# Patient Record
Sex: MALE | Race: BLACK OR AFRICAN AMERICAN | Employment: UNEMPLOYED | URBAN - METROPOLITAN AREA
[De-identification: names, ages, dates, MRNs, and addresses within clinical notes are randomized per-mention and may not be internally consistent; named-entity substitution may affect disease eponyms.]

---

## 2022-06-19 ENCOUNTER — APPOINTMENT (OUTPATIENT)
Dept: CT IMAGING | Age: 32
End: 2022-06-19
Payer: MEDICAID

## 2022-06-19 ENCOUNTER — HOSPITAL ENCOUNTER (EMERGENCY)
Age: 32
Discharge: HOME | End: 2022-06-20
Attending: STUDENT IN AN ORGANIZED HEALTH CARE EDUCATION/TRAINING PROGRAM
Payer: MEDICAID

## 2022-06-19 ENCOUNTER — APPOINTMENT (OUTPATIENT)
Dept: GENERAL RADIOLOGY | Age: 32
End: 2022-06-19
Payer: MEDICAID

## 2022-06-19 DIAGNOSIS — S81.811A LACERATION OF RIGHT LOWER EXTREMITY, INITIAL ENCOUNTER: Primary | ICD-10-CM

## 2022-06-19 LAB
ABSOLUTE EOS #: 0 K/UL (ref 0–0.4)
ABSOLUTE LYMPH #: 0.7 K/UL (ref 1–4.8)
ABSOLUTE MONO #: 0.4 K/UL (ref 0.1–1.3)
ANION GAP SERPL CALCULATED.3IONS-SCNC: 22 MMOL/L (ref 9–17)
BASOPHILS # BLD: 1 % (ref 0–2)
BASOPHILS ABSOLUTE: 0.1 K/UL (ref 0–0.2)
BUN BLDV-MCNC: 7 MG/DL (ref 6–20)
CALCIUM SERPL-MCNC: 9.4 MG/DL (ref 8.6–10.4)
CHLORIDE BLD-SCNC: 98 MMOL/L (ref 98–107)
CO2: 18 MMOL/L (ref 20–31)
CREAT SERPL-MCNC: 1.41 MG/DL (ref 0.7–1.2)
EOSINOPHILS RELATIVE PERCENT: 0 % (ref 0–4)
GFR AFRICAN AMERICAN: >60 ML/MIN
GFR NON-AFRICAN AMERICAN: 59 ML/MIN
GFR SERPL CREATININE-BSD FRML MDRD: ABNORMAL ML/MIN/{1.73_M2}
GLUCOSE BLD-MCNC: 130 MG/DL (ref 70–99)
HCT VFR BLD CALC: 39.7 % (ref 41–53)
HEMOGLOBIN: 14 G/DL (ref 13.5–17.5)
LYMPHOCYTES # BLD: 9 % (ref 24–44)
MCH RBC QN AUTO: 31.4 PG (ref 26–34)
MCHC RBC AUTO-ENTMCNC: 35.1 G/DL (ref 31–37)
MCV RBC AUTO: 89.3 FL (ref 80–100)
MONOCYTES # BLD: 6 % (ref 1–7)
PDW BLD-RTO: 13.7 % (ref 11.5–14.9)
PLATELET # BLD: 350 K/UL (ref 150–450)
PMV BLD AUTO: 7.1 FL (ref 6–12)
POTASSIUM SERPL-SCNC: 3.7 MMOL/L (ref 3.7–5.3)
RBC # BLD: 4.45 M/UL (ref 4.5–5.9)
SEG NEUTROPHILS: 84 % (ref 36–66)
SEGMENTED NEUTROPHILS ABSOLUTE COUNT: 6.4 K/UL (ref 1.3–9.1)
SODIUM BLD-SCNC: 138 MMOL/L (ref 135–144)
WBC # BLD: 7.6 K/UL (ref 3.5–11)

## 2022-06-19 PROCEDURE — 73590 X-RAY EXAM OF LOWER LEG: CPT

## 2022-06-19 PROCEDURE — 96374 THER/PROPH/DIAG INJ IV PUSH: CPT

## 2022-06-19 PROCEDURE — 80048 BASIC METABOLIC PNL TOTAL CA: CPT

## 2022-06-19 PROCEDURE — 90715 TDAP VACCINE 7 YRS/> IM: CPT | Performed by: STUDENT IN AN ORGANIZED HEALTH CARE EDUCATION/TRAINING PROGRAM

## 2022-06-19 PROCEDURE — 73630 X-RAY EXAM OF FOOT: CPT

## 2022-06-19 PROCEDURE — 6360000002 HC RX W HCPCS: Performed by: STUDENT IN AN ORGANIZED HEALTH CARE EDUCATION/TRAINING PROGRAM

## 2022-06-19 PROCEDURE — 73610 X-RAY EXAM OF ANKLE: CPT

## 2022-06-19 PROCEDURE — 73562 X-RAY EXAM OF KNEE 3: CPT

## 2022-06-19 PROCEDURE — 12002 RPR S/N/AX/GEN/TRNK2.6-7.5CM: CPT

## 2022-06-19 PROCEDURE — 85025 COMPLETE CBC W/AUTO DIFF WBC: CPT

## 2022-06-19 PROCEDURE — 2580000003 HC RX 258: Performed by: STUDENT IN AN ORGANIZED HEALTH CARE EDUCATION/TRAINING PROGRAM

## 2022-06-19 PROCEDURE — 70450 CT HEAD/BRAIN W/O DYE: CPT

## 2022-06-19 PROCEDURE — 36415 COLL VENOUS BLD VENIPUNCTURE: CPT

## 2022-06-19 PROCEDURE — 72125 CT NECK SPINE W/O DYE: CPT

## 2022-06-19 PROCEDURE — 71045 X-RAY EXAM CHEST 1 VIEW: CPT

## 2022-06-19 PROCEDURE — 99284 EMERGENCY DEPT VISIT MOD MDM: CPT

## 2022-06-19 PROCEDURE — 90471 IMMUNIZATION ADMIN: CPT | Performed by: STUDENT IN AN ORGANIZED HEALTH CARE EDUCATION/TRAINING PROGRAM

## 2022-06-19 RX ORDER — MORPHINE SULFATE 4 MG/ML
4 INJECTION, SOLUTION INTRAMUSCULAR; INTRAVENOUS ONCE
Status: COMPLETED | OUTPATIENT
Start: 2022-06-19 | End: 2022-06-19

## 2022-06-19 RX ORDER — 0.9 % SODIUM CHLORIDE 0.9 %
500 INTRAVENOUS SOLUTION INTRAVENOUS ONCE
Status: COMPLETED | OUTPATIENT
Start: 2022-06-19 | End: 2022-06-20

## 2022-06-19 RX ADMIN — TETANUS TOXOID, REDUCED DIPHTHERIA TOXOID AND ACELLULAR PERTUSSIS VACCINE, ADSORBED 0.5 ML: 5; 2.5; 8; 8; 2.5 SUSPENSION INTRAMUSCULAR at 20:03

## 2022-06-19 RX ADMIN — MORPHINE SULFATE 4 MG: 4 INJECTION, SOLUTION INTRAMUSCULAR; INTRAVENOUS at 20:02

## 2022-06-19 RX ADMIN — SODIUM CHLORIDE 500 ML: 9 INJECTION, SOLUTION INTRAVENOUS at 22:52

## 2022-06-19 ASSESSMENT — ENCOUNTER SYMPTOMS
SHORTNESS OF BREATH: 0
ABDOMINAL PAIN: 0
VOMITING: 0
BACK PAIN: 0
NAUSEA: 0

## 2022-06-19 ASSESSMENT — PAIN SCALES - GENERAL
PAINLEVEL_OUTOF10: 5
PAINLEVEL_OUTOF10: 4

## 2022-06-19 ASSESSMENT — PAIN - FUNCTIONAL ASSESSMENT: PAIN_FUNCTIONAL_ASSESSMENT: 0-10
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
## 2022-06-20 VITALS
TEMPERATURE: 98.9 F | BODY MASS INDEX: 27.09 KG/M2 | HEIGHT: 72 IN | RESPIRATION RATE: 18 BRPM | SYSTOLIC BLOOD PRESSURE: 129 MMHG | OXYGEN SATURATION: 98 % | HEART RATE: 96 BPM | DIASTOLIC BLOOD PRESSURE: 82 MMHG | WEIGHT: 200 LBS

## 2022-06-20 NOTE — ED NOTES
Patient alert and oriented x4. Patient can walk independently with a steady gait.      Annmarie Collet, RN  06/20/22 7497
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
## 2022-07-10 ENCOUNTER — HOSPITAL ENCOUNTER (EMERGENCY)
Age: 32
Discharge: HOME | End: 2022-07-10
Attending: STUDENT IN AN ORGANIZED HEALTH CARE EDUCATION/TRAINING PROGRAM
Payer: MEDICAID

## 2022-07-10 VITALS
SYSTOLIC BLOOD PRESSURE: 133 MMHG | TEMPERATURE: 98.1 F | OXYGEN SATURATION: 96 % | DIASTOLIC BLOOD PRESSURE: 71 MMHG | WEIGHT: 210 LBS | RESPIRATION RATE: 18 BRPM | HEART RATE: 80 BPM | BODY MASS INDEX: 28.48 KG/M2

## 2022-07-10 DIAGNOSIS — Z48.02 VISIT FOR SUTURE REMOVAL: Primary | ICD-10-CM

## 2022-07-10 PROCEDURE — 99282 EMERGENCY DEPT VISIT SF MDM: CPT

## 2022-07-10 ASSESSMENT — LIFESTYLE VARIABLES
HOW MANY STANDARD DRINKS CONTAINING ALCOHOL DO YOU HAVE ON A TYPICAL DAY: 1 OR 2
HOW OFTEN DO YOU HAVE A DRINK CONTAINING ALCOHOL: NEVER

## 2022-07-10 NOTE — ED PROVIDER NOTES
EMERGENCY DEPARTMENT ENCOUNTER    Pt Name: Wiley Norman  MRN: [id number]  [place] 1990  Date of evaluation: 7/10/22  CHIEF COMPLAINT       Chief Complaint   Patient presents with    Suture / Staple Removal     HISTORY OF PRESENT ILLNESS   HPI  80-year-old male history of asthma presenting for evaluation for suture removal.  Patient had sutures placed for a right ankle laceration proximately 20 days ago. No history is with the sutures. Wound is healing well. No drainage or discharge or significant pain or swelling. REVIEW OF SYSTEMS     Review of Systems   Skin: Positive for wound. All other systems reviewed and are negative. PASTMEDICAL HISTORY     Past Medical History:   Diagnosis Date    Allergies     ASD (atrial septal defect)     Asthma      Past Problem List  There is no problem list on file for this patient. SURGICAL HISTORY       Past Surgical History:   Procedure Laterality Date    ASD REPAIR       CURRENT MEDICATIONS     There are no discharge medications for this patient. ALLERGIES     has No Known Allergies. FAMILY HISTORY     has no family status information on file. SOCIAL HISTORY       Social History     Tobacco Use    Smoking status: Current Every Day Smoker     Types: Cigarettes    Smokeless tobacco: Not on file   Vaping Use    Vaping Use: Never used   Substance Use Topics    Alcohol use: Never    Drug use: Never     PHYSICAL EXAM     INITIAL VITALS: /71   Pulse 80   Temp 98.1 °F (36.7 °C) (Oral)   Resp 18   Wt 210 lb (95.3 kg)   SpO2 96%   BMI 28.48 kg/m²    Physical Exam  Vitals and nursing note reviewed. Constitutional:       Appearance: Normal appearance. HENT:      Head: Normocephalic and atraumatic. Nose: Nose normal.   Pulmonary:      Effort: Pulmonary effort is normal. No respiratory distress. Musculoskeletal:         General: No swelling. Normal range of motion.    Skin:     Comments: Healing laceration over right medial ankle sutures in place no dehiscence no surrounding erythema or warmth or drainage   Neurological:      Mental Status: He is alert. MEDICAL DECISION MAKIN-year-old male presents for evaluation for suture removal.  Wound over ankle appears to be healing well. Sutures removed without incident. Will discharge home. CRITICAL CARE:       PROCEDURES:    Suture Removal    Date/Time: 7/10/2022 1:39 PM  Performed by: Becky Cadena MD  Authorized by: Becky Cadena MD     Consent:     Consent obtained:  Verbal    Consent given by:  Patient    Risks discussed:  Bleeding, pain and wound separation    Alternatives discussed:  No treatment and delayed treatment  Location:     Location:  Lower extremity    Lower extremity location:  Ankle    Ankle location:  R ankle  Procedure details:     Wound appearance:  No signs of infection, good wound healing and clean    Number of sutures removed:  8  Post-procedure details:     Post-removal:  No dressing applied    Patient tolerance of procedure: Tolerated well, no immediate complications        DIAGNOSTIC RESULTS   EKG:All EKG's are interpreted by the Emergency Department Physician who either signs or Co-signs this chart in the absence of a cardiologist.        RADIOLOGY:All plain film, CT, MRI, and formal ultrasound images (except ED bedside ultrasound) are read by the radiologist, see reports below, unless otherwisenoted in MDM or here. No orders to display     LABS: All lab results were reviewed by myself, and all abnormals are listed below. Labs Reviewed - No data to display    EMERGENCY DEPARTMENTCOURSE:         Vitals:    Vitals:    07/10/22 1214   BP: 133/71   Pulse: 80   Resp: 18   Temp: 98.1 °F (36.7 °C)   TempSrc: Oral   SpO2: 96%   Weight: 210 lb (95.3 kg)       The patient was given the following medications while in the emergency department:  No orders of the defined types were placed in this encounter.     CONSULTS:  None    FINAL IMPRESSION 1. Visit for suture removal          DISPOSITION/PLAN   DISPOSITION Decision To Discharge 07/10/2022 12:27:10 PM      PATIENT REFERRED TO:  [facility]  [facility]  [address] [facility] [id number] [phone]  Call   As needed    DISCHARGE MEDICATIONS:  There are no discharge medications for this patient. The care is provided during an unprecedented national emergency due to the novel coronavirus, COVID 19.   MD Nancy Campos MD  07/10/22 0669

## 2023-10-04 ENCOUNTER — HOSPITAL ENCOUNTER (EMERGENCY)
Age: 33
Discharge: HOME | End: 2023-10-04
Attending: EMERGENCY MEDICINE
Payer: COMMERCIAL

## 2023-10-04 VITALS
SYSTOLIC BLOOD PRESSURE: 128 MMHG | DIASTOLIC BLOOD PRESSURE: 88 MMHG | TEMPERATURE: 98.4 F | HEART RATE: 61 BPM | RESPIRATION RATE: 16 BRPM | WEIGHT: 209.44 LBS | OXYGEN SATURATION: 99 % | BODY MASS INDEX: 28.4 KG/M2

## 2023-10-04 DIAGNOSIS — Z20.2 STD EXPOSURE: Primary | ICD-10-CM

## 2023-10-04 LAB
BILIRUB UR QL STRIP: NEGATIVE
CLARITY UR: ABNORMAL
COLOR UR: YELLOW
EPI CELLS #/AREA URNS HPF: NORMAL /HPF (ref 0–5)
GLUCOSE UR STRIP-MCNC: NEGATIVE MG/DL
HGB UR QL STRIP.AUTO: NEGATIVE
HIV 1+2 AB+HIV1 P24 AG SERPL QL IA: NONREACTIVE
KETONES UR STRIP-MCNC: NEGATIVE MG/DL
LEUKOCYTE ESTERASE UR QL STRIP: ABNORMAL
NITRITE UR QL STRIP: NEGATIVE
PH UR STRIP: 6 [PH] (ref 5–8)
PROT UR STRIP-MCNC: NEGATIVE MG/DL
RBC #/AREA URNS HPF: NORMAL /HPF (ref 0–4)
SP GR UR STRIP: 1.02 (ref 1–1.03)
T PALLIDUM AB SER QL IA: NONREACTIVE
UROBILINOGEN UR STRIP-ACNC: NORMAL EU/DL (ref 0–1)
WBC #/AREA URNS HPF: NORMAL /HPF (ref 0–5)

## 2023-10-04 PROCEDURE — 96372 THER/PROPH/DIAG INJ SC/IM: CPT

## 2023-10-04 PROCEDURE — 6360000002 HC RX W HCPCS: Performed by: STUDENT IN AN ORGANIZED HEALTH CARE EDUCATION/TRAINING PROGRAM

## 2023-10-04 PROCEDURE — 87491 CHLMYD TRACH DNA AMP PROBE: CPT

## 2023-10-04 PROCEDURE — 6370000000 HC RX 637 (ALT 250 FOR IP): Performed by: STUDENT IN AN ORGANIZED HEALTH CARE EDUCATION/TRAINING PROGRAM

## 2023-10-04 PROCEDURE — 86780 TREPONEMA PALLIDUM: CPT

## 2023-10-04 PROCEDURE — 87591 N.GONORRHOEAE DNA AMP PROB: CPT

## 2023-10-04 PROCEDURE — 87086 URINE CULTURE/COLONY COUNT: CPT

## 2023-10-04 PROCEDURE — 99284 EMERGENCY DEPT VISIT MOD MDM: CPT

## 2023-10-04 PROCEDURE — 87389 HIV-1 AG W/HIV-1&-2 AB AG IA: CPT

## 2023-10-04 PROCEDURE — 81001 URINALYSIS AUTO W/SCOPE: CPT

## 2023-10-04 RX ORDER — DOXYCYCLINE HYCLATE 100 MG
100 TABLET ORAL 2 TIMES DAILY
Qty: 14 TABLET | Refills: 0 | Status: SHIPPED | OUTPATIENT
Start: 2023-10-04 | End: 2023-10-11

## 2023-10-04 RX ORDER — DOXYCYCLINE HYCLATE 100 MG
100 TABLET ORAL ONCE
Status: COMPLETED | OUTPATIENT
Start: 2023-10-04 | End: 2023-10-04

## 2023-10-04 RX ORDER — CEFTRIAXONE 500 MG/1
500 INJECTION, POWDER, FOR SOLUTION INTRAMUSCULAR; INTRAVENOUS ONCE
Status: COMPLETED | OUTPATIENT
Start: 2023-10-04 | End: 2023-10-04

## 2023-10-04 RX ADMIN — CEFTRIAXONE SODIUM 500 MG: 500 INJECTION, POWDER, FOR SOLUTION INTRAMUSCULAR; INTRAVENOUS at 10:48

## 2023-10-04 RX ADMIN — DOXYCYCLINE HYCLATE 100 MG: 100 TABLET, COATED ORAL at 10:48

## 2023-10-04 ASSESSMENT — ENCOUNTER SYMPTOMS
SHORTNESS OF BREATH: 0
ABDOMINAL PAIN: 0
NAUSEA: 0
DIARRHEA: 0
VOMITING: 0
BACK PAIN: 0
CONSTIPATION: 0

## 2023-10-04 NOTE — ED TRIAGE NOTES
Pt presents to ED room 03 ambulatory from triage c/o a possible std exposure. Pt states that he has been experiencing dysuria, penile discharge that is yellow/white in color for about a week. Pt states that he is suspecting an STD exposure and states that he does not use condoms. Pt reports that he did experience minor LLQ abd pain the other day that lasted about 20min. Pt resting on stretcher, NAD noted, RR even and non-labored. Call light placed within reach.

## 2023-10-04 NOTE — DISCHARGE INSTRUCTIONS
You were evaluated in the emergency department for urethral discharge. You were treated for gonorrhea and chlamydia. You were given a prescription for antibiotics. This antibiotic is for chlamydia. Please take this medication as prescribed. Please finish the entire course of antibiotics even if you start to feel better. Please inform all sexual partners to get checked for STDs. Please avoid sexual contact for 2 weeks. You had negative HIV and syphilis testing. Please follow-up with your primary care physician. Please return to the emergency department for any worsening symptoms, questions or concerns.

## 2023-10-04 NOTE — ED PROVIDER NOTES
[facility] ED  Emergency Department Encounter  Emergency Medicine Resident     Pt Name:Isa Cerda  MRN: [id number]  [address] [facility] 1990  Date of evaluation: 10/4/23  PCP:  No primary care provider on file. Note Started: 10:16 AM EDT      CHIEF COMPLAINT       Chief Complaint   Patient presents with    Exposure to STD     Suspected       HISTORY OF PRESENT ILLNESS  (Location/Symptom, Timing/Onset, Context/Setting, Quality, Duration, Modifying Factors, Severity.)      Chandana Newman is a 29 y.o. male who presents with urethral discharge and concern for STD exposure. Patient states that he has sex with men and women but has only had 2 sexual partners in the last month both women. States that he has had 1 to 2 weeks of urethral discharge as well as dysuria. Patient states that he has had gonorrhea in the past and states that this feels like it did then. Denying any penile pain, lesions, scrotal pain or testicular pain. Denies trauma. Denies fevers or chills or recent illnesses. Denies abdominal pain, nausea or vomiting, constipation or diarrhea. PAST MEDICAL / SURGICAL / SOCIAL / FAMILY HISTORY      has a past medical history of Allergies, ASD (atrial septal defect), and Asthma. has a past surgical history that includes ASD repair.     Social History     Socioeconomic History    Marital status: Single     Spouse name: Not on file    Number of children: Not on file    Years of education: Not on file    Highest education level: Not on file   Occupational History    Not on file   Tobacco Use    Smoking status: Every Day     Types: Cigarettes    Smokeless tobacco: Not on file   Vaping Use    Vaping Use: Never used   Substance and Sexual Activity    Alcohol use: Never    Drug use: Never    Sexual activity: Not on file   Other Topics Concern    Not on file   Social History Narrative    Not on file     Social Determinants of Health     Financial Resource Strain: Not on file   Food Insecurity: hyclate (VIBRA-TABS) 100 MG tablet Take 1 tablet by mouth 2 times daily for 7 days, Disp-14 tablet, R-0Print             Ismael Yeboah DO  Emergency Medicine Resident    (Please note that portions of this note were completed with a voice recognition program.  Efforts were made to edit the dictations but occasionally words are mis-transcribed.)       Ismael Yeboah DO  Resident  10/05/23 0700

## 2023-10-05 LAB
CHLAMYDIA DNA UR QL NAA+PROBE: NEGATIVE
MICROORGANISM SPEC CULT: NO GROWTH
N GONORRHOEA DNA UR QL NAA+PROBE: ABNORMAL
SPECIMEN DESCRIPTION: ABNORMAL
SPECIMEN DESCRIPTION: NORMAL

## 2023-10-10 ENCOUNTER — TELEPHONE (OUTPATIENT)
Dept: PHARMACY | Age: 33
End: 2023-10-10

## 2024-02-19 ENCOUNTER — HOSPITAL ENCOUNTER (OUTPATIENT)
Age: 34
Setting detail: OBSERVATION
Discharge: HOME | End: 2024-02-20
Attending: EMERGENCY MEDICINE | Admitting: EMERGENCY MEDICINE
Payer: COMMERCIAL

## 2024-02-19 DIAGNOSIS — R40.4 TRANSIENT ALTERATION OF AWARENESS: ICD-10-CM

## 2024-02-19 DIAGNOSIS — F22 DELUSIONS (HCC): ICD-10-CM

## 2024-02-19 DIAGNOSIS — F12.90 MARIJUANA USE: Primary | ICD-10-CM

## 2024-02-19 DIAGNOSIS — R79.89 ELEVATED TROPONIN: ICD-10-CM

## 2024-02-19 LAB
BASOPHILS # BLD: 0.05 K/UL (ref 0–0.2)
BASOPHILS NFR BLD: 1 % (ref 0–2)
EOSINOPHIL # BLD: 0.09 K/UL (ref 0–0.44)
EOSINOPHILS RELATIVE PERCENT: 1 % (ref 1–4)
ERYTHROCYTE [DISTWIDTH] IN BLOOD BY AUTOMATED COUNT: 11.5 % (ref 11.8–14.4)
HCT VFR BLD AUTO: 45.8 % (ref 40.7–50.3)
HGB BLD-MCNC: 14.5 G/DL (ref 13–17)
IMM GRANULOCYTES # BLD AUTO: <0.03 K/UL (ref 0–0.3)
IMM GRANULOCYTES NFR BLD: 0 %
LYMPHOCYTES NFR BLD: 0.79 K/UL (ref 1.1–3.7)
LYMPHOCYTES RELATIVE PERCENT: 13 % (ref 24–43)
MCH RBC QN AUTO: 30.9 PG (ref 25.2–33.5)
MCHC RBC AUTO-ENTMCNC: 31.7 G/DL (ref 28.4–34.8)
MCV RBC AUTO: 97.4 FL (ref 82.6–102.9)
MONOCYTES NFR BLD: 0.36 K/UL (ref 0.1–1.2)
MONOCYTES NFR BLD: 6 % (ref 3–12)
NEUTROPHILS NFR BLD: 79 % (ref 36–65)
NEUTS SEG NFR BLD: 4.96 K/UL (ref 1.5–8.1)
NRBC BLD-RTO: 0 PER 100 WBC
PLATELET # BLD AUTO: 269 K/UL (ref 138–453)
PMV BLD AUTO: 8.9 FL (ref 8.1–13.5)
RBC # BLD AUTO: 4.7 M/UL (ref 4.21–5.77)
WBC OTHER # BLD: 6.3 K/UL (ref 3.5–11.3)

## 2024-02-19 PROCEDURE — 93005 ELECTROCARDIOGRAM TRACING: CPT

## 2024-02-19 PROCEDURE — 84484 ASSAY OF TROPONIN QUANT: CPT

## 2024-02-19 PROCEDURE — 80143 DRUG ASSAY ACETAMINOPHEN: CPT

## 2024-02-19 PROCEDURE — G0480 DRUG TEST DEF 1-7 CLASSES: HCPCS

## 2024-02-19 PROCEDURE — 99284 EMERGENCY DEPT VISIT MOD MDM: CPT

## 2024-02-19 PROCEDURE — 85025 COMPLETE CBC W/AUTO DIFF WBC: CPT

## 2024-02-19 PROCEDURE — 80179 DRUG ASSAY SALICYLATE: CPT

## 2024-02-19 PROCEDURE — 80307 DRUG TEST PRSMV CHEM ANLYZR: CPT

## 2024-02-19 PROCEDURE — 80048 BASIC METABOLIC PNL TOTAL CA: CPT

## 2024-02-20 VITALS
BODY MASS INDEX: 27.12 KG/M2 | TEMPERATURE: 97.9 F | OXYGEN SATURATION: 98 % | HEART RATE: 64 BPM | SYSTOLIC BLOOD PRESSURE: 119 MMHG | DIASTOLIC BLOOD PRESSURE: 83 MMHG | WEIGHT: 200 LBS | RESPIRATION RATE: 16 BRPM

## 2024-02-20 PROBLEM — F19.920 INTOXICATION BY DRUG, UNCOMPLICATED (HCC): Status: ACTIVE | Noted: 2024-02-20

## 2024-02-20 LAB
ALBUMIN SERPL-MCNC: 3.7 G/DL (ref 3.5–5.2)
ALBUMIN/GLOB SERPL: 1.2 {RATIO} (ref 1–2.5)
ALP SERPL-CCNC: 57 U/L (ref 40–129)
ALT SERPL-CCNC: 27 U/L (ref 5–41)
AMPHET UR QL SCN: NEGATIVE
ANION GAP SERPL CALCULATED.3IONS-SCNC: 10 MMOL/L (ref 9–17)
ANION GAP SERPL CALCULATED.3IONS-SCNC: 15 MMOL/L (ref 9–17)
APAP SERPL-MCNC: <5 UG/ML (ref 10–30)
AST SERPL-CCNC: 33 U/L
BARBITURATES UR QL SCN: NEGATIVE
BENZODIAZ UR QL: NEGATIVE
BILIRUB SERPL-MCNC: 0.6 MG/DL (ref 0.3–1.2)
BUN SERPL-MCNC: 11 MG/DL (ref 6–20)
BUN SERPL-MCNC: 14 MG/DL (ref 6–20)
CALCIUM SERPL-MCNC: 8.7 MG/DL (ref 8.6–10.4)
CALCIUM SERPL-MCNC: 9.3 MG/DL (ref 8.6–10.4)
CANNABINOIDS UR QL SCN: POSITIVE
CHLORIDE SERPL-SCNC: 102 MMOL/L (ref 98–107)
CHLORIDE SERPL-SCNC: 106 MMOL/L (ref 98–107)
CO2 SERPL-SCNC: 16 MMOL/L (ref 20–31)
CO2 SERPL-SCNC: 21 MMOL/L (ref 20–31)
COCAINE UR QL SCN: POSITIVE
CREAT SERPL-MCNC: 1 MG/DL (ref 0.7–1.2)
CREAT SERPL-MCNC: 1.1 MG/DL (ref 0.7–1.2)
EKG ATRIAL RATE: 60 BPM
EKG ATRIAL RATE: 81 BPM
EKG P AXIS: 54 DEGREES
EKG P AXIS: 59 DEGREES
EKG P-R INTERVAL: 188 MS
EKG P-R INTERVAL: 196 MS
EKG Q-T INTERVAL: 390 MS
EKG Q-T INTERVAL: 438 MS
EKG QRS DURATION: 142 MS
EKG QRS DURATION: 142 MS
EKG QTC CALCULATION (BAZETT): 438 MS
EKG QTC CALCULATION (BAZETT): 453 MS
EKG R AXIS: 13 DEGREES
EKG R AXIS: 21 DEGREES
EKG T AXIS: 33 DEGREES
EKG T AXIS: 9 DEGREES
EKG VENTRICULAR RATE: 60 BPM
EKG VENTRICULAR RATE: 81 BPM
ETHANOL PERCENT: <0.01 %
ETHANOLAMINE SERPL-MCNC: <10 MG/DL
FENTANYL UR QL: NEGATIVE
GFR SERPL CREATININE-BSD FRML MDRD: >60 ML/MIN/1.73M2
GFR SERPL CREATININE-BSD FRML MDRD: >60 ML/MIN/1.73M2
GLUCOSE SERPL-MCNC: 113 MG/DL (ref 70–99)
GLUCOSE SERPL-MCNC: 150 MG/DL (ref 70–99)
METHADONE UR QL: NEGATIVE
OPIATES UR QL SCN: NEGATIVE
OXYCODONE UR QL SCN: NEGATIVE
PCP UR QL SCN: NEGATIVE
POTASSIUM SERPL-SCNC: 3.7 MMOL/L (ref 3.7–5.3)
POTASSIUM SERPL-SCNC: 4.3 MMOL/L (ref 3.7–5.3)
PROT SERPL-MCNC: 6.8 G/DL (ref 6.4–8.3)
SALICYLATES SERPL-MCNC: <1 MG/DL (ref 3–10)
SODIUM SERPL-SCNC: 133 MMOL/L (ref 135–144)
SODIUM SERPL-SCNC: 137 MMOL/L (ref 135–144)
TEST INFORMATION: ABNORMAL
TOXIC TRICYCLIC SC,BLOOD: NEGATIVE
TROPONIN I SERPL HS-MCNC: 14 NG/L (ref 0–22)
TROPONIN I SERPL HS-MCNC: 18 NG/L (ref 0–22)
TROPONIN I SERPL HS-MCNC: 18 NG/L (ref 0–22)
TROPONIN I SERPL HS-MCNC: 19 NG/L (ref 0–22)

## 2024-02-20 PROCEDURE — 93005 ELECTROCARDIOGRAM TRACING: CPT

## 2024-02-20 PROCEDURE — 93010 ELECTROCARDIOGRAM REPORT: CPT | Performed by: INTERNAL MEDICINE

## 2024-02-20 PROCEDURE — 6360000002 HC RX W HCPCS

## 2024-02-20 PROCEDURE — 80053 COMPREHEN METABOLIC PANEL: CPT

## 2024-02-20 PROCEDURE — 84484 ASSAY OF TROPONIN QUANT: CPT

## 2024-02-20 PROCEDURE — 80307 DRUG TEST PRSMV CHEM ANLYZR: CPT

## 2024-02-20 PROCEDURE — 96372 THER/PROPH/DIAG INJ SC/IM: CPT

## 2024-02-20 PROCEDURE — 2580000003 HC RX 258

## 2024-02-20 PROCEDURE — G0378 HOSPITAL OBSERVATION PER HR: HCPCS

## 2024-02-20 PROCEDURE — 99223 1ST HOSP IP/OBS HIGH 75: CPT | Performed by: INTERNAL MEDICINE

## 2024-02-20 RX ORDER — ONDANSETRON 4 MG/1
4 TABLET, ORALLY DISINTEGRATING ORAL EVERY 8 HOURS PRN
Status: DISCONTINUED | OUTPATIENT
Start: 2024-02-20 | End: 2024-02-20 | Stop reason: HOSPADM

## 2024-02-20 RX ORDER — SODIUM CHLORIDE 9 MG/ML
INJECTION, SOLUTION INTRAVENOUS PRN
Status: DISCONTINUED | OUTPATIENT
Start: 2024-02-20 | End: 2024-02-20 | Stop reason: HOSPADM

## 2024-02-20 RX ORDER — ENOXAPARIN SODIUM 100 MG/ML
40 INJECTION SUBCUTANEOUS DAILY
Status: DISCONTINUED | OUTPATIENT
Start: 2024-02-20 | End: 2024-02-20 | Stop reason: HOSPADM

## 2024-02-20 RX ORDER — ACETAMINOPHEN 325 MG/1
650 TABLET ORAL EVERY 6 HOURS PRN
Status: DISCONTINUED | OUTPATIENT
Start: 2024-02-20 | End: 2024-02-20 | Stop reason: HOSPADM

## 2024-02-20 RX ORDER — SODIUM CHLORIDE 0.9 % (FLUSH) 0.9 %
5-40 SYRINGE (ML) INJECTION PRN
Status: DISCONTINUED | OUTPATIENT
Start: 2024-02-20 | End: 2024-02-20 | Stop reason: HOSPADM

## 2024-02-20 RX ORDER — ACETAMINOPHEN 650 MG/1
650 SUPPOSITORY RECTAL EVERY 6 HOURS PRN
Status: DISCONTINUED | OUTPATIENT
Start: 2024-02-20 | End: 2024-02-20 | Stop reason: HOSPADM

## 2024-02-20 RX ORDER — POLYETHYLENE GLYCOL 3350 17 G/17G
17 POWDER, FOR SOLUTION ORAL DAILY PRN
Status: DISCONTINUED | OUTPATIENT
Start: 2024-02-20 | End: 2024-02-20 | Stop reason: HOSPADM

## 2024-02-20 RX ORDER — ONDANSETRON 2 MG/ML
4 INJECTION INTRAMUSCULAR; INTRAVENOUS EVERY 6 HOURS PRN
Status: DISCONTINUED | OUTPATIENT
Start: 2024-02-20 | End: 2024-02-20 | Stop reason: HOSPADM

## 2024-02-20 RX ORDER — 0.9 % SODIUM CHLORIDE 0.9 %
1000 INTRAVENOUS SOLUTION INTRAVENOUS ONCE
Status: COMPLETED | OUTPATIENT
Start: 2024-02-20 | End: 2024-02-20

## 2024-02-20 RX ORDER — SODIUM CHLORIDE 0.9 % (FLUSH) 0.9 %
5-40 SYRINGE (ML) INJECTION EVERY 12 HOURS SCHEDULED
Status: DISCONTINUED | OUTPATIENT
Start: 2024-02-20 | End: 2024-02-20 | Stop reason: HOSPADM

## 2024-02-20 RX ADMIN — SODIUM CHLORIDE, PRESERVATIVE FREE 10 ML: 5 INJECTION INTRAVENOUS at 08:36

## 2024-02-20 RX ADMIN — ENOXAPARIN SODIUM 40 MG: 100 INJECTION SUBCUTANEOUS at 08:35

## 2024-02-20 RX ADMIN — SODIUM CHLORIDE 1000 ML: 9 INJECTION, SOLUTION INTRAVENOUS at 01:39

## 2024-02-20 ASSESSMENT — ENCOUNTER SYMPTOMS
VOMITING: 0
ABDOMINAL PAIN: 0
DIARRHEA: 0
COUGH: 0
SHORTNESS OF BREATH: 0
NAUSEA: 0
BACK PAIN: 0

## 2024-02-20 ASSESSMENT — HEART SCORE
ECG: 0
ECG: 0

## 2024-02-20 ASSESSMENT — PAIN - FUNCTIONAL ASSESSMENT: PAIN_FUNCTIONAL_ASSESSMENT: NONE - DENIES PAIN

## 2024-02-20 NOTE — PROGRESS NOTES
[facility]  CDU / OBSERVATION ENCOUNTER  ATTENDING NOTE       I performed a history and physical examination of the patient and discussed management with the resident or midlevel provider. I reviewed the resident or midlevel provider's note and agree with the documented findings and plan of care. Any areas of disagreement are noted on the chart. I was personally present for the key portions of any procedures. I have documented in the chart those procedures where I was not present during the key portions. I have reviewed the nurses notes. I agree with the chief complaint, past medical history, past surgical history, allergies, medications, social and family history as documented unless otherwise noted below.    The Family history, social history, and ROS are effectively unchanged since admission unless noted elsewhere in the chart.    This patient was placed in the observation unit for reevaluation for possible admission to the hospital    Patient presents with past medical history of psychiatric illness who was found walking without pansinus apartment complex.  Patient was brought by EMS after apparently ingesting Gummies and has a history of cocaine use.  Patient was brought to the hospital for further evaluation.    Patient denied suicidal ideation and denied hallucinations.  No immediate concerns for trauma.  Patient did have unknown ingestion.  Initially minimally cooperative out of and minimally cooperative but this was de-escalated without medications.    ER discussed case with patient's mother.  Patient is acting differently than usual and mother is concerned about drug intoxication and marijuana Gummies.     We evaluated patient with parents present.  Patient was feeling well and looking much better at the time of evaluation this morning.  Patient had minimal symptoms and was looking to get some help as outpatient.  Patient and family discussed the case with us.  Patient is

## 2024-02-20 NOTE — H&P
[facility]  CDU / OBSERVATION ENCOUNTER  ATTENDING NOTE     Pt Name: Isa Cardoso  MRN: [id number]  Birthdate 1990  Date of evaluation: 2/20/24  Patient's PCP is :  No primary care provider on file.    CHIEF COMPLAINT       Chief Complaint   Patient presents with    Drug Overdose     Cocaine + etoh +  weed gummies         HISTORY OF PRESENT ILLNESS    Isa Cardoso is a 34 y.o. male who presents with altered mental status, delusions, drug use.  Found walking outside without pants on.  Per EMS/PD, patient has a history of cocaine use and reported using weed Gummies.  On arrival to the ED, patient was calling everybody \"God\" but denies suicidal ideation, homicidal ideation, visual or auditory hallucinations.  Denies chest pain but has a history of atrial septal defect.  Patient's mother arrived to the ED who confirmed that patient is acting more euphoric than normal.    This morning, patient feeling much better.  Denies any complaints.  However, patient is alert and oriented x 2 to person and place.  Unable to identify month or year.    History was obtained in part through review of the ED chart. When possible, a direct discussion was had with ED nurses, residents, and attendings  REVIEW OF SYSTEMS       General ROS - No fevers, No malaise   Ophthalmic ROS - No discharge, No changes in vision  ENT ROS -  No sore throat, No rhinorrhea,   Respiratory ROS - no shortness of breath, no cough, no  wheezing  Cardiovascular ROS - No chest pain, no dyspnea on exertion  Gastrointestinal ROS - No abdominal pain, no nausea or vomiting, no change in bowel habits, no black or bloody stools  Genito-Urinary ROS - No dysuria, trouble voiding, or hematuria  Musculoskeletal ROS - No myalgias, No arthalgias  Neurological ROS - No headache, no dizziness/lightheadedness, No focal weakness, no loss of sensation  Dermatological ROS - No lesions, No rash     (PQRS) Advance directives on face sheet per hospital  WITH AUTO DIFFERENTIAL - Abnormal; Notable for the following components:       Result Value    RDW 11.5 (*)     Neutrophils % 79 (*)     Lymphocytes % 13 (*)     Lymphocytes Absolute 0.79 (*)     All other components within normal limits   BASIC METABOLIC PANEL - Abnormal; Notable for the following components:    Sodium 133 (*)     CO2 16 (*)     Glucose 150 (*)     All other components within normal limits   TOX SCR, BLD, ED - Abnormal; Notable for the following components:    Acetaminophen Level <5 (*)     Salicylate Lvl <1.0 (*)     All other components within normal limits   URINE DRUG SCREEN - Abnormal; Notable for the following components:    Cocaine Metabolite, Urine POSITIVE (*)     Cannabinoid Scrn, Ur POSITIVE (*)     All other components within normal limits   COMPREHENSIVE METABOLIC PANEL W/ REFLEX TO MG FOR LOW K - Abnormal; Notable for the following components:    Glucose 113 (*)     All other components within normal limits   TROPONIN   TROPONIN   TROPONIN   TROPONIN         CDU IMPRESSION / PLAN      Markees PAUL Cardoso is a 34 y.o. male who presents with likely drug intoxication    Concerns for drug intoxication  UDS positive for cocaine and cannabinoids  Ethanol level was negative  Troponins: 14, 18, 19  Heart score of 3  Cardiology consulted  Appreciate recommendations  Nothing to do inpatient  Mental status: Concern that this is secondary to drug intoxication versus acute psychosis versus bipolar disorder  A&O x 2, unable to identify month or year  On repeat evaluation, patient is fully oriented. Above was likely secondary to patient waking up upon myself entering the room.  Shared decision making with family and patient  Discharged with social work resources  Continue home medications and pain control  Monitor vitals, labs, and imaging  DISPO: pending consults and clinical improvement    CONSULTS:    IP CONSULT TO CARDIOLOGY  IP CONSULT TO SOCIAL WORK    PROCEDURES:  Not indicated       PATIENT

## 2024-02-20 NOTE — CONSULTS
[facility]   Consult Note                 Date:   2/20/2024  Patient name: Isa Cardoso  Date of admission:  2/19/2024 11:15 PM  MRN:   [id number]  YOB: 1990    Reason for Consult:  Elevated troponin    CHIEF COMPLAINT:  Altered mental status    History Obtained From:  Patient     HISTORY OF PRESENT ILLNESS:    The patient is a 34 y.o. male with medical history listed below who presents to the emergency department via EMS/PD with altered mental status. Per reports, the patient contacted his mother the day of presentation who noted that he seemed different on the phone, prompting her to contact EMS. According to first responders, the patient was walking around his home without any pants on with altered mental status. The patient does have a history of substance abuse, including cocaine, and it was reported that the patient took a gummy from a friend at work and since that time he notes he \"felt different\". Patient's mother notes that patient was acting more \"euphoric\" than normal. Patient was delusional on presentation calling everyone God. Patient denies chest pain, shortness of breath, diaphoresis, N/V. On exam this morning, patient continues to deny chest pain, shortness of breath. Patient is resting comfortably in bed.       Past Medical History:   has a past medical history of Allergies, ASD (atrial septal defect), and Asthma.    Past Surgical History:   has a past surgical history that includes ASD repair.     Home Medications:    Prior to Admission medications    Not on File       Allergies:  Patient has no known allergies.    Social History:   reports that he has been smoking cigarettes. He does not have any smokeless tobacco history on file. He reports that he does not drink alcohol and does not use drugs.     Family History: family history is not on file. No for premature CAD. No for h/o sudden cardiac death    REVIEW OF SYSTEMS:    Constitutional: there has been no  in the last 72 hours.  FASTING LIPID PANEL:No results found for: \"HDL\", \"LDLDIRECT\", \"LDLCALC\", \"TRIG\"  LIVER PROFILE:  Recent Labs     02/20/24  0438   AST 33   ALT 27   LABALBU 3.7       DATA:    Diagnostics:      EKG - 2/19/24  Sinus, RBBB    Cath - N/A      Echo - 3/19/18  · Dx: Atrial septal defect   · Mild concentric hypertrophy   · The estimated left ventricular ejection fraction is 55 - 60%.   · The right ventricle is mildly dilated   · Right atrium is mildly dilated   · Trace mitral regurgitation.   · Mild tricuspid valve regurgitation. Calculated RVSP: 36 mmHg.       Stress - N/A      IMPRESSION:    Altered mental status w/ cocaine and cannabinoid intoxication  Hx of ASD s/p repair   Family Hx of early cardiac disease  Vitals stable  UDS positive for cocaine, cannabinoids  Troponin 14 > 18 > 19  EKG: NSR w/ RBBB      RECOMMENDATIONS:    No acute intervention from cardiology  Patient stable for discharge  Please wait for final attestation from attending     Discussed with patient and nursing.    Kwesi Warner  [facility]   Pager - [phone]     Attending Cardiologist Addendum: I have reviewed and performed the history, physical, subjective, objective, assessment, and plan with the student/resident/fellow/APN and agree with the note. I performed the history and physical personally. I have done the MDM. I have made changes to the note above as needed.    Patient was seen and examined  Cardiology was consulted.  The patient has history of cocaine abuse.  He also took some Gummies.  He came in with altered mental status.  Apparently has a history of ASD status postrepair in the past.  Currently he was evaluated, has no complaints of CP or SOB.  His troponins were 14 and 18.  Will plan for outpatient follow-up.    Discussed with patient in detail. All questions answered. Agrees with plan as outlined above.     Thank you for allowing me to participate in the care of this patient, please do

## 2024-02-20 NOTE — ED NOTES
Pt is updated on plan of care, is placed back on full monitor, and is given warm blankets. Pt provided with urinal. 400 mL urine output. Socks placed on patient. Pt denies needs at this time. Will continue to monitor.

## 2024-02-20 NOTE — ED NOTES
Pt came to ed via ems w complaint of altered mental status/ drug overdose. Pt sis altered, unable to communicate some information and keep repeating \"why you messing with me\" and asking \"where are the aliens\". Pt refers to RN as \"Fahad\". Reportedly pt took some edible weed gummies, ETOH, and cocaine. Was found outside by EMS naked, walking around. Reportedly, tpt has no mental hx and this is unlike pts baseline. Pt reports no injury. VSS, NAD. Patient resting in bed, respirations even and unlabored. Call light in reach Pt connected to telemetry, all alarms on and audible.

## 2024-02-20 NOTE — ED NOTES
ED to inpatient nurses report    Chief Complaint   Patient presents with    Drug Overdose     Cocaine + etoh +  weed gummies      Present to ED from home for OD, weed gummies, cocaine, and ETOH use  LOC: alert and orientated to name, place, date  Vital signs   Vitals:    02/20/24 0538 02/20/24 0545 02/20/24 0600 02/20/24 0615   BP:  115/81 116/76 (!) 107/55   Pulse: 64 65 61 60   Resp: 13 15 15 11   Temp:       TempSrc:       SpO2: 97% 98%        Oxygen Baseline RA    Current needs required RA   LDAs:   Peripheral IV 02/19/24 Right Antecubital (Active)     Mobility: Independent  Pending ED orders: NA  Present condition: A&OX4, resting on full stretcher, NAD. Family at bedside.   Code Status: [unfilled] FULL  Consults:  []  Hospitalist  Completed  [] yes [] no  []  Medicine  Completed  [] yes [] No  [x]  Cardiology  Completed  [] yes [] No  []  GI   Completed  [] yes [] No  []  Neurology  Completed  [] yes [] No  []  Nephrology Completed  [] yes [] No  []  Vascular  Completed  [] yes [] No   []  Surgery  Completed  [] yes [] No   []  Urology  Completed  [] yes [] No   []  Plastics  Completed  [] yes [] No   []  ENT  Completed  [] yes [] No   []  Other   Completed  [] yes [] No  Pertinent event(s) Drug OD, stable now    Electronically signed by Luciana Mercado RN on 2/20/2024 at 6:53 AM

## 2024-02-20 NOTE — ED NOTES
ED to inpatient nurses report      Chief Complaint:  Chief Complaint   Patient presents with    Drug Overdose     Cocaine + etoh +  weed gummies     Present to ED from: ems     MOA:     LOC: aox3, improving   Mobility: Independent  Oxygen Baseline: room air     Current needs required: n/a    Pending ED orders: n/a  Present condition: resting in bed w mom at bedside     Why did the patient come to the ED? Pt came to ed via ems w complaint of altered mental status/ drug overdose. Pt sis altered, unable to communicate some information and keep repeating \"why you messing with me\" and asking \"where are the aliens\". Pt refers to RN as \"Fahad\". Reportedly pt took some edible weed gummies, ETOH, and cocaine. Was found outside by EMS naked, walking around. Reportedly, tpt has no mental hx and this is unlike pts baseline. Pt reports no injury. VSS, NAD. Patient resting in bed, respirations even and unlabored. Call light in reach Pt connected to telemetry, all alarms on and audible.       What is the plan? Admit to obs   Any procedures or intervention occur? Line, lab, EKG, fluids, CT  Any safety concerns??    Mental Status:  Level of Consciousness: Alert (0)    Psych Assessment:   Psychosocial  Psychosocial (WDL): (S) Exceptions to WDL (altered)  Vital signs   Vitals:    02/20/24 0130 02/20/24 0200 02/20/24 0209 02/20/24 0250   BP: 115/73 114/84     Pulse: 77 79 75 79   Resp:    14   Temp:       TempSrc:       SpO2:            Vitals:  Patient Vitals for the past 24 hrs:   BP Temp Temp src Pulse Resp SpO2   02/20/24 0250 -- -- -- 79 14 --   02/20/24 0209 -- -- -- 75 -- --   02/20/24 0200 114/84 -- -- 79 -- --   02/20/24 0130 115/73 -- -- 77 -- --   02/19/24 2345 (!) 144/85 -- -- 82 -- --   02/19/24 2329 (!) 134/91 99.4 °F (37.4 °C) Axillary -- -- --   02/19/24 2315 -- -- -- 85 20 98 %      Visit Vitals  /84   Pulse 79   Temp 99.4 °F (37.4 °C) (Axillary)   Resp 14   SpO2 98%        LDAs:   Peripheral IV 02/19/24 Right

## 2024-02-20 NOTE — DISCHARGE INSTRUCTIONS
Please follow with your PCP as scheduled as well as with the counseling/therapy resources you were provided. We highly recommend Ti, Carli, or [facility].     PLEASE RETURN TO THE EMERGENCY DEPARTMENT IMMEDIATELY for worsening symptoms, increase in the amount of diarrhea you are having, abdominal pain, blood in your stool, vomiting up blood, persistent nausea and/or vomiting, or if you develop any concerning symptoms such as: high fever not relieved by acetaminophen (Tylenol) and/or ibuprofen (Motrin / Advil), chills, shortness of breath, chest pain, feeling of your heart fluttering or racing, loss of consciousness, numbness, weakness or tingling in the arms or legs or change in color of the extremities, changes in mental status, persistent headache, blurry vision, loss of bladder / bowel control, unable to follow up with your physician, or other any other care or concern.

## 2024-02-20 NOTE — ED NOTES
SW consulted due to altered mentation with drug abuse.  Patient has no mental health history.  SW met with patient who was pleasant, alert, and oriented.  Patient's mother and her  at bedside.  SW asked patient if he would like to speak alone or with family present.  Patient agreeable to family being in room.  Patient admits to drinking some alcohol last night and consuming \"weed gummies\".  He was hesitant to admit he uses cocaine daily for about 10 years now as his parents were not aware.  Patient denies crack cocaine usage and states he snorts cocaine.  Patient acknowledges he has a problem and would like help.  Patient unsure if he would like inpatient or outpatient treatment as he has a full-time job.  Resources provided and all questions answered.  Patient being admitted for cardiac concerns and acknowledges how hard cocaine use is on the heart.  SW spoke with inpatient SW with request to follow-up with patient about treatment options as he would like to discuss this with his family and employer possibly.  Patient/family told ED SW available should they have any further questions.  KETURAH Dang

## 2024-02-20 NOTE — ED PROVIDER NOTES
[facility]     Emergency Department     Faculty Attestation    I performed a history and physical examination of the patient and discussed management with the resident. I have reviewed and agree with the resident’s findings including all diagnostic interpretations, and treatment plans as written. Any areas of disagreement are noted on the chart. I was personally present for the key portions of any procedures. I have documented in the chart those procedures where I was not present during the key portions. I have reviewed the emergency nurses triage note. I agree with the chief complaint, past medical history, past surgical history, allergies, medications, social and family history as documented unless otherwise noted below. Documentation of the HPI, Physical Exam and Medical Decision Making performed by scribmotny is based on my personal performance of the HPI, PE and MDM. For Physician Assistant/ Nurse Practitioner cases/documentation I have personally evaluated this patient and have completed at least one if not all key elements of the E/M (history, physical exam, and MDM). Additional findings are as noted.    Note Started: 11:32 PM EST     33 yo M overdose etoh / cocaine / gummy, no injury, no cp, no vomit, no suicidal or homicidal ideation,   PE colorful affect, jimena, no cervical tenderness, crepitus, or deformity,  Radial pulse=, d pedal pulse =,     Trop 14 > 18 > 19 observation admit    EKG Interpretation    Interpreted by me  Normal sinus, heart rate 81, rbbb,  no ischemia, normal axis, QT corrected 453    CRITICAL CARE: There was a high probability of clinically significant/life threatening deterioration in this patient's condition which required my urgent intervention.  Total critical care time was 5 minutes.  This excludes any time for separately reportable procedures.       Sixto Vargas DO  02/19/24 3097       R[place]

## 2024-02-20 NOTE — ED PROVIDER NOTES
[facility] ED  Emergency Department Encounter  Emergency Medicine Resident     Pt Name:Isa Cardoso  MRN: [id number]  Birthdate 1990  Date of evaluation: 2/20/24  PCP:  No primary care provider on file.  Note Started: 2:04 AM EST      CHIEF COMPLAINT       Chief Complaint   Patient presents with    Drug Overdose     Cocaine + etoh +  weed gummies       HISTORY OF PRESENT ILLNESS  (Location/Symptom, Timing/Onset, Context/Setting, Quality, Duration, Modifying Factors, Severity.)      Isa Cardoso is a 34 y.o. male who presents with altered mental status, delusions, drug usage.    34-year-old male without past medical history of psych history who was found walking without pants on at his apartment complex.  Supposedly via EMS/PD patient contacted mother telling them that he felt different and he also endorses taking Gummies.  Patient does have a history of cocaine usage supposedly via EMS and PD.  Patient was found without his pants was able to get sweatpants on and subsequently brought to the hospital for further evaluation and treatment.  Patient was calling everybody \"God \"denies any suicidal ideation, homicidal ideation, hallucinations both visual or auditory.  No immediate concerns for trauma.  Unknown ingestion.  Patient was minimally combative/cooperative on initial presentation.  Patient was able to be de-escalated without as needed medication.  Patient with supposed history of tobacco use and cocaine usage.     Patient without chest pain but patient with history of atrial septal defect and significant cardiac history in the family as well as cocaine usage.    Presentation reviewed with patient's mother.  Patient's mother endorses that the patient is acting differently more euphoric.  Patient was raised in a [employer] household and mother is a  so she is uncertain and that current presentation is confusing whether this is a manifestation of the patient's ricky or any

## 2024-02-20 NOTE — PROGRESS NOTES
[facility] [place]  PROGRESS NOTE    Shift date: 2/19/2024  Shift day: Monday   Shift # 3    Room # 24/24   Name: Isa Cardoso                Episcopal: [employer] [place]   Place of Judaism: [facility]    Referral: Routine Visit    Admit Date & Time: 2/19/2024 11:15 PM    Assessment:  Isa Cardoso is a 34 y.o. male in the hospital because of an \"Overdose.\" Upon entering the room writer observes patient sitting over edge of bed, with family present, and ED Resident nearby.    Intervention:   visited patient per initial rounding visits in the ED. Writer introduced self and title as . Per report, patient \"took a gummy and began having hallucinations with a Tenriism theme.\" Patient was coping well and calm at time of visit. Patient's mother, Zoraida, was present in room. Patient shared about his Synagogue and  updated his chart to list his ricky affiliation. Per report, patient will be admitted to Observation after discharge from the ED. Patient and mother were open to 's visit and support.     Outcome:  Patient and family thanked  for visit and care.    Plan:  Chaplains will remain available to offer spiritual and emotional support as needed.       02/20/24 0353   Encounter Summary   Encounter Overview/Reason  Initial Encounter   Service Provided For: Patient and family together   Referral/Consult From: Rounding  (ED Rounding)   Support System Parent   Last Encounter  02/19/24   Complexity of Encounter Low   Begin Time 0353   End Time  0409   Total Time Calculated 16 min   Spiritual/Emotional needs   Type Spiritual Support   Assessment/Intervention/Outcome   Assessment Calm;Coping   Intervention Sustaining Presence/Ministry of presence   Outcome Receptive   Plan and Referrals   Plan/Referrals Continue to visit, (comment)  (as needed)     Electronically signed by Chaplain Lilia, on 2/20/2024 at 6:12 AM.  [facility]  [place]  [phone]

## 2024-02-21 NOTE — DISCHARGE SUMMARY
Absolute 0.36 0.10 - 1.20 k/uL    Eosinophils Absolute 0.09 0.00 - 0.44 k/uL    Basophils Absolute 0.05 0.00 - 0.20 k/uL    Absolute Immature Granulocyte <0.03 0.00 - 0.30 k/uL   Basic Metabolic Panel   Result Value Ref Range    Sodium 133 (L) 135 - 144 mmol/L    Potassium 3.7 3.7 - 5.3 mmol/L    Chloride 102 98 - 107 mmol/L    CO2 16 (L) 20 - 31 mmol/L    Anion Gap 15 9 - 17 mmol/L    Glucose 150 (H) 70 - 99 mg/dL    BUN 14 6 - 20 mg/dL    Creatinine 1.1 0.7 - 1.2 mg/dL    Est, Glom Filt Rate >60 >60 mL/min/1.73m2    Calcium 9.3 8.6 - 10.4 mg/dL   TOX SCR, BLD, ED   Result Value Ref Range    Acetaminophen Level <5 (L) 10 - 30 ug/mL    Ethanol <10 <10 mg/dL    Ethanol percent <0.010 <0.010 %    Salicylate Lvl <1.0 (L) 3 - 10 mg/dL    Toxic Tricyclic Sc,Blood NEGATIVE NEGATIVE   Urine Drug Screen   Result Value Ref Range    Amphetamine Screen, Ur NEGATIVE NEGATIVE    Barbiturate Screen, Ur NEGATIVE NEGATIVE    Benzodiazepine Screen, Urine NEGATIVE NEGATIVE    Cocaine Metabolite, Urine POSITIVE (A) NEGATIVE    Methadone Screen, Urine NEGATIVE NEGATIVE    Opiates, Urine NEGATIVE NEGATIVE    Phencyclidine, Urine NEGATIVE NEGATIVE    Cannabinoid Scrn, Ur POSITIVE (A) NEGATIVE    Oxycodone Screen, Ur NEGATIVE NEGATIVE    Fentanyl, Ur NEGATIVE NEGATIVE    Test Information       Assay provides medical screening only.  The absence of expected drug(s) and/or metabolite(s) may indicate diluted or adulterated urine, limitations of testing or timing of collection.   Troponin   Result Value Ref Range    Troponin, High Sensitivity 19 0 - 22 ng/L   Comprehensive Metabolic Panel w/ Reflex to MG   Result Value Ref Range    Sodium 137 135 - 144 mmol/L    Potassium 4.3 3.7 - 5.3 mmol/L    Chloride 106 98 - 107 mmol/L    CO2 21 20 - 31 mmol/L    Anion Gap 10 9 - 17 mmol/L    Glucose 113 (H) 70 - 99 mg/dL    BUN 11 6 - 20 mg/dL    Creatinine 1.0 0.7 - 1.2 mg/dL    Est, Glom Filt Rate >60 >60 mL/min/1.73m2    Calcium 8.7 8.6 - 10.4 mg/dL  Good    Patient stable and ready for discharge home. I have discussed plan of care with patient and they are in understanding. They were instructed to read discharge paperwork. All of their questions and concerns were addressed.     Time Spent: 0 day      --  Lisha Olivares MD  Emergency Medicine Resident Physician    This dictation was generated by voice recognition computer software.  Although all attempts are made to edit the dictation for accuracy, there may be errors in the transcription that are not intended.

## 2024-04-19 ENCOUNTER — APPOINTMENT (OUTPATIENT)
Dept: GENERAL RADIOLOGY | Age: 34
End: 2024-04-19
Payer: COMMERCIAL

## 2024-04-19 ENCOUNTER — HOSPITAL ENCOUNTER (EMERGENCY)
Age: 34
Discharge: HOME | End: 2024-04-19
Attending: EMERGENCY MEDICINE
Payer: COMMERCIAL

## 2024-04-19 VITALS
RESPIRATION RATE: 19 BRPM | OXYGEN SATURATION: 99 % | TEMPERATURE: 97.5 F | WEIGHT: 200 LBS | DIASTOLIC BLOOD PRESSURE: 75 MMHG | BODY MASS INDEX: 27.09 KG/M2 | HEIGHT: 72 IN | SYSTOLIC BLOOD PRESSURE: 121 MMHG | HEART RATE: 86 BPM

## 2024-04-19 DIAGNOSIS — R00.2 PALPITATIONS: Primary | ICD-10-CM

## 2024-04-19 DIAGNOSIS — R42 LIGHTHEADED: ICD-10-CM

## 2024-04-19 LAB
ANION GAP SERPL CALCULATED.3IONS-SCNC: 11 MMOL/L (ref 9–16)
BASOPHILS # BLD: 0.06 K/UL (ref 0–0.2)
BASOPHILS NFR BLD: 1 % (ref 0–2)
BUN SERPL-MCNC: 15 MG/DL (ref 6–20)
CALCIUM SERPL-MCNC: 8.8 MG/DL (ref 8.6–10.4)
CHLORIDE SERPL-SCNC: 103 MMOL/L (ref 98–107)
CO2 SERPL-SCNC: 24 MMOL/L (ref 20–31)
CREAT SERPL-MCNC: 1.1 MG/DL (ref 0.7–1.2)
EOSINOPHIL # BLD: 0.29 K/UL (ref 0–0.44)
EOSINOPHILS RELATIVE PERCENT: 6 % (ref 1–4)
ERYTHROCYTE [DISTWIDTH] IN BLOOD BY AUTOMATED COUNT: 11.9 % (ref 11.8–14.4)
GFR SERPL CREATININE-BSD FRML MDRD: >90 ML/MIN/1.73M2
GLUCOSE SERPL-MCNC: 95 MG/DL (ref 74–99)
HCT VFR BLD AUTO: 47.1 % (ref 40.7–50.3)
HGB BLD-MCNC: 15.1 G/DL (ref 13–17)
IMM GRANULOCYTES # BLD AUTO: <0.03 K/UL (ref 0–0.3)
IMM GRANULOCYTES NFR BLD: 0 %
LYMPHOCYTES NFR BLD: 1.76 K/UL (ref 1.1–3.7)
LYMPHOCYTES RELATIVE PERCENT: 38 % (ref 24–43)
MAGNESIUM SERPL-MCNC: 2.3 MG/DL (ref 1.6–2.6)
MCH RBC QN AUTO: 30.3 PG (ref 25.2–33.5)
MCHC RBC AUTO-ENTMCNC: 32.1 G/DL (ref 28.4–34.8)
MCV RBC AUTO: 94.4 FL (ref 82.6–102.9)
MONOCYTES NFR BLD: 0.4 K/UL (ref 0.1–1.2)
MONOCYTES NFR BLD: 9 % (ref 3–12)
NEUTROPHILS NFR BLD: 46 % (ref 36–65)
NEUTS SEG NFR BLD: 2.08 K/UL (ref 1.5–8.1)
NRBC BLD-RTO: 0 PER 100 WBC
PLATELET # BLD AUTO: 300 K/UL (ref 138–453)
PMV BLD AUTO: 8.9 FL (ref 8.1–13.5)
POTASSIUM SERPL-SCNC: 4.1 MMOL/L (ref 3.7–5.3)
RBC # BLD AUTO: 4.99 M/UL (ref 4.21–5.77)
SODIUM SERPL-SCNC: 138 MMOL/L (ref 136–145)
TROPONIN I SERPL HS-MCNC: 15 NG/L (ref 0–22)
TROPONIN I SERPL HS-MCNC: 16 NG/L (ref 0–22)
TSH SERPL DL<=0.05 MIU/L-ACNC: 1.11 UIU/ML (ref 0.27–4.2)
WBC OTHER # BLD: 4.6 K/UL (ref 3.5–11.3)

## 2024-04-19 PROCEDURE — 71045 X-RAY EXAM CHEST 1 VIEW: CPT

## 2024-04-19 PROCEDURE — 84443 ASSAY THYROID STIM HORMONE: CPT

## 2024-04-19 PROCEDURE — 99285 EMERGENCY DEPT VISIT HI MDM: CPT

## 2024-04-19 PROCEDURE — 83735 ASSAY OF MAGNESIUM: CPT

## 2024-04-19 PROCEDURE — 80048 BASIC METABOLIC PNL TOTAL CA: CPT

## 2024-04-19 PROCEDURE — 93005 ELECTROCARDIOGRAM TRACING: CPT | Performed by: EMERGENCY MEDICINE

## 2024-04-19 PROCEDURE — 2580000003 HC RX 258: Performed by: EMERGENCY MEDICINE

## 2024-04-19 PROCEDURE — 85025 COMPLETE CBC W/AUTO DIFF WBC: CPT

## 2024-04-19 PROCEDURE — 84484 ASSAY OF TROPONIN QUANT: CPT

## 2024-04-19 RX ORDER — 0.9 % SODIUM CHLORIDE 0.9 %
1000 INTRAVENOUS SOLUTION INTRAVENOUS ONCE
Status: COMPLETED | OUTPATIENT
Start: 2024-04-19 | End: 2024-04-19

## 2024-04-19 RX ADMIN — SODIUM CHLORIDE 1000 ML: 9 INJECTION, SOLUTION INTRAVENOUS at 21:05

## 2024-04-20 ASSESSMENT — ENCOUNTER SYMPTOMS
ABDOMINAL PAIN: 0
SHORTNESS OF BREATH: 0

## 2024-04-20 NOTE — ED PROVIDER NOTES
[facility]  Emergency Department  Faculty Attestation     I performed a history and physical examination of the patient and discussed management with the resident. I reviewed the resident’s note and agree with the documented findings and plan of care. Any areas of disagreement are noted on the chart. I was personally present for the key portions of any procedures. I have documented in the chart those procedures where I was not present during the key portions. I have reviewed the emergency nurses triage note. I agree with the chief complaint, past medical history, past surgical history, allergies, medications, social and family history as documented unless otherwise noted below.    For Physician Assistant/ Nurse Practitioner cases/documentation I have personally evaluated this patient and have completed at least one if not all key elements of the E/M (history, physical exam, and MDM). Additional findings are as noted.    Preliminary note started at 9:06 PM EDT    Primary Care Physician:  No primary care provider on file.    Screenings:  [unfilled]    CHIEF COMPLAINT       Chief Complaint   Patient presents with    Palpitations       RECENT VITALS:   /75   Pulse 86   Temp 97.5 °F (36.4 °C) (Oral)   Resp 19   Ht 1.829 m (6')   Wt 90.7 kg (200 lb)   SpO2 99%   BMI 27.12 kg/m²     LABS:  Labs Reviewed   CBC WITH AUTO DIFFERENTIAL   BASIC METABOLIC PANEL   MAGNESIUM   TROPONIN   TROPONIN   TSH WITH REFLEX       Radiology  XR CHEST PORTABLE    (Results Pending)       CRITICAL CARE: There was a high probability of clinically significant/life threatening deterioration in this patient's condition which required my urgent intervention.  Total critical care time was none minutes.  This excludes any time for separately reportable procedures.     EKG:  EKG Interpretation    Interpreted by me    Rhythm: normal sinus   Rate: normal  Axis: normal  Ectopy: none  Conduction: Right

## 2024-04-20 NOTE — ED NOTES
Pt arrives to ED 34 via triage.  Pt co palpitations.  Pt states he was at work and left early.  Pt states when these episodes happen he begins to feel lightheaded.  Pt denies any chest pain.  Pt denies taking any medications daily.  Pt states he does have a cardiac history.  Pt states that he has a hx of asthma and does albuterol tx as needed.  Pt respirations are even and unlabored, pt is alert and oriented X 4, speaking in complete sentences, bed is in the lowest position, call light is within reach, NAD noted.   Will continue to follow plan of care.

## 2024-04-20 NOTE — DISCHARGE INSTRUCTIONS
You were seen here for lightheadedness and palpitations.    You need to call and schedule follow-up appointment with your primary care provider as well as with a cardiologist for soon as possible.    Return to the emergency department immediately if you experience worsening symptoms, develop any other symptoms, or if you have any other concerns.

## 2024-04-20 NOTE — ED PROVIDER NOTES
[facility] ED  Emergency Department Encounter  Emergency Medicine Resident     Pt Name:Isa Cardoso  MRN: [id number]  Birthdate 1990  Date of evaluation: 4/19/24  PCP:  No primary care provider on file.  Note Started: 8:45 PM EDT      CHIEF COMPLAINT       Chief Complaint   Patient presents with    Palpitations       HISTORY OF PRESENT ILLNESS  (Location/Symptom, Timing/Onset, Context/Setting, Quality, Duration, Modifying Factors, Severity.)      Isa Cardoso is a 34 y.o. male who presents with palpitations.  Patient stated that the palpitations started earlier today when he was at work causing him to have to leave early.  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  Patient stated that when the palpitations were occurring he was lightheaded.  Patient states that the palpitations have resolved however he still feels lightheaded.  Patient states that he is eating and hydrating appropriately.  Patient does have a cardiac history including ASD repair in the past. Patient denies any chest pain, shortness of breath, recent illnesses, fever, abdominal pain, vomiting.    PAST MEDICAL / SURGICAL / SOCIAL / FAMILY HISTORY      has a past medical history of Allergies, ASD (atrial septal defect), and Asthma.     has a past surgical history that includes ASD repair.    Social History     Socioeconomic History    Marital status: Single     Spouse name: Not on file    Number of children: Not on file    Years of education: Not on file    Highest education level: Not on file   Occupational History    Not on file   Tobacco Use    Smoking status: Every Day     Types: Cigarettes    Smokeless tobacco: Not on file   Vaping Use    Vaping Use: Never used   Substance and Sexual Activity    Alcohol use: Never    Drug use: Never    Sexual activity: Not on file   Other Topics Concern    Not on file   Social History Narrative    Not on file     Social Determinants of Health     Financial Resource

## 2024-04-21 LAB
EKG ATRIAL RATE: 79 BPM
EKG P AXIS: 79 DEGREES
EKG P-R INTERVAL: 174 MS
EKG Q-T INTERVAL: 414 MS
EKG QRS DURATION: 154 MS
EKG QTC CALCULATION (BAZETT): 474 MS
EKG R AXIS: 16 DEGREES
EKG T AXIS: 40 DEGREES
EKG VENTRICULAR RATE: 79 BPM

## 2024-04-21 PROCEDURE — 93010 ELECTROCARDIOGRAM REPORT: CPT | Performed by: INTERNAL MEDICINE

## 2024-07-15 ENCOUNTER — HOSPITAL ENCOUNTER (EMERGENCY)
Age: 34
Discharge: HOME | End: 2024-07-15
Attending: EMERGENCY MEDICINE
Payer: COMMERCIAL

## 2024-07-15 VITALS
OXYGEN SATURATION: 96 % | RESPIRATION RATE: 19 BRPM | TEMPERATURE: 97 F | BODY MASS INDEX: 27.09 KG/M2 | SYSTOLIC BLOOD PRESSURE: 123 MMHG | WEIGHT: 200 LBS | HEART RATE: 68 BPM | DIASTOLIC BLOOD PRESSURE: 82 MMHG | HEIGHT: 72 IN

## 2024-07-15 DIAGNOSIS — N41.0 ACUTE PROSTATITIS: Primary | ICD-10-CM

## 2024-07-15 LAB
BACTERIA URNS QL MICRO: ABNORMAL
BILIRUB UR QL STRIP: NEGATIVE
CASTS #/AREA URNS LPF: ABNORMAL /LPF (ref 0–8)
CLARITY UR: CLEAR
COLOR UR: YELLOW
EPI CELLS #/AREA URNS HPF: ABNORMAL /HPF (ref 0–5)
GLUCOSE UR STRIP-MCNC: NEGATIVE MG/DL
HGB UR QL STRIP.AUTO: NEGATIVE
KETONES UR STRIP-MCNC: NEGATIVE MG/DL
LEUKOCYTE ESTERASE UR QL STRIP: ABNORMAL
NITRITE UR QL STRIP: NEGATIVE
PH UR STRIP: 5.5 [PH] (ref 5–8)
PROT UR STRIP-MCNC: NEGATIVE MG/DL
RBC #/AREA URNS HPF: ABNORMAL /HPF (ref 0–4)
SP GR UR STRIP: 1.02 (ref 1–1.03)
UROBILINOGEN UR STRIP-ACNC: NORMAL EU/DL (ref 0–1)
WBC #/AREA URNS HPF: ABNORMAL /HPF (ref 0–5)

## 2024-07-15 PROCEDURE — 6360000002 HC RX W HCPCS

## 2024-07-15 PROCEDURE — 87086 URINE CULTURE/COLONY COUNT: CPT

## 2024-07-15 PROCEDURE — 99284 EMERGENCY DEPT VISIT MOD MDM: CPT

## 2024-07-15 PROCEDURE — 87591 N.GONORRHOEAE DNA AMP PROB: CPT

## 2024-07-15 PROCEDURE — 81001 URINALYSIS AUTO W/SCOPE: CPT

## 2024-07-15 PROCEDURE — 87491 CHLMYD TRACH DNA AMP PROBE: CPT

## 2024-07-15 PROCEDURE — 6370000000 HC RX 637 (ALT 250 FOR IP)

## 2024-07-15 PROCEDURE — 96372 THER/PROPH/DIAG INJ SC/IM: CPT

## 2024-07-15 RX ORDER — DOXYCYCLINE HYCLATE 100 MG
100 TABLET ORAL 2 TIMES DAILY
Qty: 28 TABLET | Refills: 0 | Status: SHIPPED | OUTPATIENT
Start: 2024-07-15 | End: 2024-07-29

## 2024-07-15 RX ORDER — DOXYCYCLINE HYCLATE 100 MG
100 TABLET ORAL ONCE
Status: COMPLETED | OUTPATIENT
Start: 2024-07-15 | End: 2024-07-15

## 2024-07-15 RX ORDER — CEFTRIAXONE 500 MG/1
500 INJECTION, POWDER, FOR SOLUTION INTRAMUSCULAR; INTRAVENOUS ONCE
Status: COMPLETED | OUTPATIENT
Start: 2024-07-15 | End: 2024-07-15

## 2024-07-15 RX ADMIN — DOXYCYCLINE HYCLATE 100 MG: 100 TABLET, COATED ORAL at 14:38

## 2024-07-15 RX ADMIN — CEFTRIAXONE SODIUM 500 MG: 500 INJECTION, POWDER, FOR SOLUTION INTRAMUSCULAR; INTRAVENOUS at 14:39

## 2024-07-15 ASSESSMENT — PAIN - FUNCTIONAL ASSESSMENT: PAIN_FUNCTIONAL_ASSESSMENT: 0-10

## 2024-07-15 ASSESSMENT — PAIN SCALES - GENERAL: PAINLEVEL_OUTOF10: 9

## 2024-07-15 ASSESSMENT — LIFESTYLE VARIABLES
HOW MANY STANDARD DRINKS CONTAINING ALCOHOL DO YOU HAVE ON A TYPICAL DAY: PATIENT DOES NOT DRINK
HOW OFTEN DO YOU HAVE A DRINK CONTAINING ALCOHOL: NEVER

## 2024-07-15 ASSESSMENT — PAIN DESCRIPTION - LOCATION: LOCATION: GROIN

## 2024-07-15 NOTE — DISCHARGE INSTRUCTIONS
You are seen here for lower abdominal pain, as well as concerns for STD.    You still have some labs waiting to confirm an STD infection.  Please follow-up with them on MyChart.  If not, our pharmacist will call you with any positive results.    We believe you have prostatitis, this is an infection of the prostate, and is usually caused by STDs, but can be caused by other bacteria as well.  We are giving you a shot here, as well as doxycycline.  Please take it for the entire 14 days.    Please follow-up with your primary care provider.  If you do not have 1, I have provided number for the [facility] clinic at [place].  Please call and follow-up with them.    He may return to the emergency department for any new or worsening symptoms.  I have included them in the following pages with information on prostatitis.

## 2024-07-15 NOTE — ED NOTES
Pt is A+Ox4  Pt complains of abdominal pain x 1 day  Pt denies dysuria  Pt denies penile discharge  Pt denies nausea and vomiting  Pt denies diarrhea  All questions answered and needs met at this time

## 2024-07-15 NOTE — ED PROVIDER NOTES
[facility]     Emergency Department     Faculty Attestation    I performed a history and physical examination of the patient and discussed management with the resident. I reviewed the resident´s note and agree with the documented findings and plan of care. Any areas of disagreement are noted on the chart. I was personally present for the key portions of any procedures. I have documented in the chart those procedures where I was not present during the key portions. I have reviewed the emergency nurses triage note. I agree with the chief complaint, past medical history, past surgical history, allergies, medications, social and family history as documented unless otherwise noted below. For Physician Assistant/ Nurse Practitioner cases/documentation I have personally evaluated this patient and have completed at least one if not all key elements of the E/M (history, physical exam, and MDM). Additional findings are as noted.    Mild suprapubic pain without erythema or swelling, external genitalia normal, no ulcerations, no urethral discharge, no testicular pain on exam.     Byron Bains MD  07/15/24 5785    
Not on file   Stress: Not on file   Social Connections: Not on file   Intimate Partner Violence: Not on file   Housing Stability: Not on file       History reviewed. No pertinent family history.    Allergies:  Patient has no known allergies.    Home Medications:  Prior to Admission medications    Medication Sig Start Date End Date Taking? Authorizing Provider   doxycycline hyclate (VIBRA-TABS) 100 MG tablet Take 1 tablet by mouth 2 times daily for 14 days 7/15/24 7/29/24 Yes Juan Diego Falcon MD         REVIEW OF SYSTEMS       Review of Systems  Unremarkable as otherwise noted in HPI  PHYSICAL EXAM      INITIAL VITALS:   /82   Pulse 68   Temp 97 °F (36.1 °C)   Resp 19   Ht 1.829 m (6')   Wt 90.7 kg (200 lb)   SpO2 96%   BMI 27.12 kg/m²     Physical Exam  Constitutional:       Appearance: Normal appearance.   Abdominal:      General: Abdomen is flat. Bowel sounds are normal.      Palpations: Abdomen is soft.      Tenderness: There is abdominal tenderness in the right lower quadrant, suprapubic area and left lower quadrant.      Hernia: No hernia is present.   Neurological:      Mental Status: He is alert.           DDX/DIAGNOSTIC RESULTS / EMERGENCY DEPARTMENT COURSE / MDM     Medical Decision Making  34-year-old male with no significant pertinent history, no new partners, no specific rash, lesion, presents with lower abdominal pain/suprapubic pain.    Patient states nothing makes better or worse, does not have any discharge, no dysuria, however is coming in for this pain and wondering if he has an STD.    Will assess patient for STD at this time.  We also provided ultrasound to rule out any significant issues.  Patient does have some minor tenderness in the lower abdomen, however physical exam otherwise unremarkable.    Differential diagnosis STD versus unlikely hernia due to no specific point tenderness in the suprapubic space/groin.    If urine STD check is negative, patient will most likely be

## 2024-07-16 LAB
CHLAMYDIA DNA UR QL NAA+PROBE: NEGATIVE
MICROORGANISM SPEC CULT: NORMAL
N GONORRHOEA DNA UR QL NAA+PROBE: NEGATIVE
SERVICE CMNT-IMP: NORMAL
SPECIMEN DESCRIPTION: NORMAL
SPECIMEN DESCRIPTION: NORMAL

## 2024-12-17 ENCOUNTER — HOSPITAL ENCOUNTER (EMERGENCY)
Age: 34
Discharge: HOME | End: 2024-12-17
Attending: EMERGENCY MEDICINE

## 2024-12-17 VITALS
TEMPERATURE: 99.7 F | SYSTOLIC BLOOD PRESSURE: 124 MMHG | RESPIRATION RATE: 16 BRPM | HEART RATE: 95 BPM | DIASTOLIC BLOOD PRESSURE: 102 MMHG | OXYGEN SATURATION: 99 %

## 2024-12-17 DIAGNOSIS — R44.3 HALLUCINATIONS: Primary | ICD-10-CM

## 2024-12-17 LAB
ALBUMIN SERPL-MCNC: 4.7 G/DL (ref 3.5–5.2)
ALBUMIN/GLOB SERPL: 1.3 {RATIO} (ref 1–2.5)
ALP SERPL-CCNC: 72 U/L (ref 40–129)
ALT SERPL-CCNC: 90 U/L (ref 10–50)
ANION GAP SERPL CALCULATED.3IONS-SCNC: 16 MMOL/L (ref 9–16)
APAP SERPL-MCNC: <5 UG/ML (ref 10–30)
AST SERPL-CCNC: 197 U/L (ref 10–50)
BASOPHILS # BLD: 0.04 K/UL (ref 0–0.2)
BASOPHILS NFR BLD: 1 % (ref 0–2)
BILIRUB SERPL-MCNC: 1.4 MG/DL (ref 0–1.2)
BUN SERPL-MCNC: 11 MG/DL (ref 6–20)
CALCIUM SERPL-MCNC: 9.6 MG/DL (ref 8.6–10.4)
CHLORIDE SERPL-SCNC: 98 MMOL/L (ref 98–107)
CO2 SERPL-SCNC: 20 MMOL/L (ref 20–31)
CREAT SERPL-MCNC: 1.2 MG/DL (ref 0.7–1.2)
EOSINOPHIL # BLD: 0.05 K/UL (ref 0–0.44)
EOSINOPHILS RELATIVE PERCENT: 1 % (ref 1–4)
ERYTHROCYTE [DISTWIDTH] IN BLOOD BY AUTOMATED COUNT: 11.6 % (ref 11.8–14.4)
ETHANOL PERCENT: <0.01 %
ETHANOLAMINE SERPL-MCNC: <10 MG/DL (ref 0–0.08)
GFR, ESTIMATED: 82 ML/MIN/1.73M2
GLUCOSE SERPL-MCNC: 124 MG/DL (ref 74–99)
HCT VFR BLD AUTO: 47.3 % (ref 40.7–50.3)
HGB BLD-MCNC: 15.2 G/DL (ref 13–17)
IMM GRANULOCYTES # BLD AUTO: <0.03 K/UL (ref 0–0.3)
IMM GRANULOCYTES NFR BLD: 0 %
LYMPHOCYTES NFR BLD: 1.33 K/UL (ref 1.1–3.7)
LYMPHOCYTES RELATIVE PERCENT: 21 % (ref 24–43)
MCH RBC QN AUTO: 29.5 PG (ref 25.2–33.5)
MCHC RBC AUTO-ENTMCNC: 32.1 G/DL (ref 28.4–34.8)
MCV RBC AUTO: 91.7 FL (ref 82.6–102.9)
MONOCYTES NFR BLD: 0.53 K/UL (ref 0.1–1.2)
MONOCYTES NFR BLD: 8 % (ref 3–12)
NEUTROPHILS NFR BLD: 69 % (ref 36–65)
NEUTS SEG NFR BLD: 4.34 K/UL (ref 1.5–8.1)
NRBC BLD-RTO: 0 PER 100 WBC
PLATELET # BLD AUTO: 306 K/UL (ref 138–453)
PMV BLD AUTO: 9 FL (ref 8.1–13.5)
POTASSIUM SERPL-SCNC: 3.9 MMOL/L (ref 3.7–5.3)
PROT SERPL-MCNC: 8.3 G/DL (ref 6.6–8.7)
RBC # BLD AUTO: 5.16 M/UL (ref 4.21–5.77)
SALICYLATES SERPL-MCNC: <0.5 MG/DL (ref 0–10)
SODIUM SERPL-SCNC: 134 MMOL/L (ref 136–145)
WBC OTHER # BLD: 6.3 K/UL (ref 3.5–11.3)

## 2024-12-17 PROCEDURE — 80143 DRUG ASSAY ACETAMINOPHEN: CPT

## 2024-12-17 PROCEDURE — 85025 COMPLETE CBC W/AUTO DIFF WBC: CPT

## 2024-12-17 PROCEDURE — 80053 COMPREHEN METABOLIC PANEL: CPT

## 2024-12-17 PROCEDURE — G0480 DRUG TEST DEF 1-7 CLASSES: HCPCS

## 2024-12-17 PROCEDURE — 80179 DRUG ASSAY SALICYLATE: CPT

## 2024-12-17 PROCEDURE — 99283 EMERGENCY DEPT VISIT LOW MDM: CPT | Performed by: EMERGENCY MEDICINE

## 2024-12-17 PROCEDURE — 6370000000 HC RX 637 (ALT 250 FOR IP)

## 2024-12-17 RX ORDER — OLANZAPINE 5 MG/1
5 TABLET, ORALLY DISINTEGRATING ORAL ONCE
Status: COMPLETED | OUTPATIENT
Start: 2024-12-17 | End: 2024-12-17

## 2024-12-17 RX ADMIN — OLANZAPINE 5 MG: 5 TABLET, ORALLY DISINTEGRATING ORAL at 05:00

## 2024-12-17 ASSESSMENT — PAIN SCALES - GENERAL: PAINLEVEL_OUTOF10: 0

## 2024-12-17 NOTE — ED NOTES
The patient is a 34 year old male that has no known psychiatric history. The patient came to the ED today due to auditory hallucinations. The patient states that he here his neighbor talking to him and hears people on his significant other's phone talking to him. The patient denied command hallucinations. The patient denied any suicidal or homicidal thoughts or plans. The patient is not responding to internal stimuli. The patient is Alert and orientated x3. The patient does report that he has been using cocaine and alcohol today. SW and patient processed how drugs and alcohol can make his symptoms worse. The patient is agreeable to get assessed for mental health outpatient needs at [facility] this morning. The patient can contract for safety. There is no evidence that the patients mental health is hindering his ability to care for himself. Outpatient does appear to be the least restrictive environment.

## 2024-12-17 NOTE — ED NOTES
..Patient ready for discharge. No concerns of intoxication or the ability to make good decisions for herself. Patient denied any housing or food insecurity concerns. No acute mental health concerns but patient provided with information on walk in assessment times for [facility]. Patient agreeable to go at 8am.

## 2024-12-17 NOTE — ED TRIAGE NOTES
Pt arriving to ed 07 via triage  Pt requesting psych eval and potential rehab  Pt has been having worsening auditory hallucinations for about 2 weeks now where he feels he is hearing his significant other talking to the neighbor when she denies this happening   Admits to cocaine and alcohol use tonight

## 2024-12-17 NOTE — ED NOTES
RN to room as patient seemed to be actively hallucinating. Pt continued to state to girlfriend that she was communicating with neighbors, when she was not. Pt appeared paranoid. Girlfriend was escorted by RN to waiting room to provide comfort and safety for both patient and girlfriend.

## 2024-12-17 NOTE — DISCHARGE INSTRUCTIONS
You were seen and evaluated for auditory hallucinations at [facility].  At this time you are denying any homicidal or suicidal ideation.  You received 5 mg of Zyprexa which is an antipsychotic medication.  Please follow-up at the [facility], attached is contact information.    Please return to the ED or call 911 with any thoughts of harming herself or harming anyone else, or any other concerns.

## 2024-12-17 NOTE — ED PROVIDER NOTES
[facility]     Emergency Department     Faculty Attestation    I performed a history and physical examination of the patient and discussed management with the resident. I have reviewed and agree with the resident’s findings including all diagnostic interpretations, and treatment plans as written. Any areas of disagreement are noted on the chart. I was personally present for the key portions of any procedures. I have documented in the chart those procedures where I was not present during the key portions. I have reviewed the emergency nurses triage note. I agree with the chief complaint, past medical history, past surgical history, allergies, medications, social and family history as documented unless otherwise noted below. Documentation of the HPI, Physical Exam and Medical Decision Making performed by keyaibmonty is based on my personal performance of the HPI, PE and MDM. For Physician Assistant/ Nurse Practitioner cases/documentation I have personally evaluated this patient and have completed at least one if not all key elements of the E/M (history, physical exam, and MDM). Additional findings are as noted.    Note Started: 4:16 AM EST     35 yo M pt request resource for psych, using etoh / cocaine daily, hearing voices, no suicidal or homicidal ideation,   PE vss gcs 15 ALEKSANDRA no tongue fasciculations, no cervical tenderness, crepitus, or deformity, no pronator drift, no tremor,  No pressured speech, patient oriented, appropriate, no acute distress  ---pt alert, oriented, seen by social work / given out pt resource, no indication of acute withdraw, vss   EKG Interpretation    Interpreted by me      CRITICAL CARE: There was a high probability of clinically significant/life threatening deterioration in this patient's condition which required my urgent intervention.  Total critical care time was 0 minutes.  This excludes any time for separately reportable 
System    Hunger Screening     Within the past 12 months we worried whether our food would run out before we got money to buy more.: Often True     Within the past 12 months the food we bought just didn't last and we didn't have money to get more.: Often True   Transportation Needs: Not on file   Physical Activity: Not on file   Stress: Not on file   Social Connections: Not on file   Intimate Partner Violence: Unknown (2/22/2024)    Received from [facility], [facility]     Fear of Current or Ex-Partner: Not on file     Emotionally Abused: Not on file     Physically Abused: Not on file     Sexually Abused: Not on file     Physically or Sexually Abused: Not on file   Housing Stability: Not on file       No family history on file.    Allergies:  Patient has no known allergies.    Home Medications:  Prior to Admission medications    Not on File         REVIEW OF SYSTEMS       Review of Systems  See HPI  PHYSICAL EXAM      INITIAL VITALS:   BP (!) 124/102   Pulse 95   Temp 99.7 °F (37.6 °C)   Resp 16   SpO2 99%     Physical Exam  Constitutional:       Comments: Flat affect   HENT:      Head: Normocephalic and atraumatic.      Mouth/Throat:      Mouth: Mucous membranes are moist.      Pharynx: Oropharynx is clear.   Eyes:      Extraocular Movements: Extraocular movements intact.      Pupils: Pupils are equal, round, and reactive to light.   Cardiovascular:      Rate and Rhythm: Normal rate and regular rhythm.   Pulmonary:      Effort: Pulmonary effort is normal. No respiratory distress.      Breath sounds: Normal breath sounds.   Abdominal:      Palpations: Abdomen is soft.      Tenderness: There is no abdominal tenderness. There is no guarding or rebound.   Skin:     General: Skin is warm and dry.      Capillary Refill: Capillary refill takes less than 2 seconds.   Neurological:      General: No focal deficit present.      Mental Status: He is alert and oriented